# Patient Record
Sex: MALE | Race: WHITE | NOT HISPANIC OR LATINO
[De-identification: names, ages, dates, MRNs, and addresses within clinical notes are randomized per-mention and may not be internally consistent; named-entity substitution may affect disease eponyms.]

---

## 2017-05-18 ENCOUNTER — OTHER (OUTPATIENT)
Age: 77
End: 2017-05-18

## 2018-08-20 ENCOUNTER — APPOINTMENT (OUTPATIENT)
Dept: ENDOCRINOLOGY | Facility: CLINIC | Age: 78
End: 2018-08-20

## 2018-08-24 ENCOUNTER — APPOINTMENT (OUTPATIENT)
Dept: ENDOCRINOLOGY | Facility: CLINIC | Age: 78
End: 2018-08-24

## 2019-02-11 ENCOUNTER — APPOINTMENT (OUTPATIENT)
Dept: ENDOCRINOLOGY | Facility: CLINIC | Age: 79
End: 2019-02-11

## 2020-03-17 ENCOUNTER — APPOINTMENT (OUTPATIENT)
Dept: OPHTHALMOLOGY | Facility: CLINIC | Age: 80
End: 2020-03-17

## 2020-06-02 ENCOUNTER — APPOINTMENT (OUTPATIENT)
Dept: POPULATION HEALTH | Facility: CLINIC | Age: 80
End: 2020-06-02

## 2020-09-03 ENCOUNTER — APPOINTMENT (OUTPATIENT)
Dept: POPULATION HEALTH | Facility: CLINIC | Age: 80
End: 2020-09-03
Payer: MEDICARE

## 2020-09-03 PROCEDURE — 99205 OFFICE O/P NEW HI 60 MIN: CPT | Mod: 95

## 2020-09-03 NOTE — HEALTH RISK ASSESSMENT
[Patient reported colonoscopy was normal] : Patient reported colonoscopy was normal [ColonoscopyDate] : 02/17

## 2020-09-03 NOTE — HISTORY OF PRESENT ILLNESS
[FreeTextEntry1] : 79 yo m pmh R kidney cancer, DM2, HTN, HCL for telehealth visit regarding longstanding exposure to the [place] plume and is concerned about its impact on overall health.\par \par Kidney R ca in 2008, surgery only. Recurrence in 2011, treated with partial nephrectomy.\par \par Moved to [place] 3/1986 and still in same house.\par Grew vegetables for many years.\par \par In the house\par Son was in house for 12 years.\par \par 2018 soil was tested. \par Drink from bottled water x 5 y\par \par \par Never smoker.\par \par [place] [employer] [place] when son was a child.\par \par  > 30 years.\par Never smoker\par \par No family hx of kidney cancer. No hx of kidney disease\par

## 2020-09-03 NOTE — ASSESSMENT
[FreeTextEntry1] : 79 yo m pmh R kidney cancer, DM2, HTN, HCL for telehealth visit regarding longstanding exposure to the [place] plume which likely contributed to his kidney cancer. He has been exposed for ~35 years and 23 years at the time of his diagnosis. He moved there at time when the drinking water was likely contaminated and he was further exposed via contaminated soil, consumption of contaminated vegetables, and likely via inhalation following vapor intrusion. Of the contaminants in the plume, the ones most strongly linked to kidney cancer are TCE and cadmium. \par \par Moving forward there are additional cancers for which there is increased risk at risk. Among the cancers known to be associated with contaminants found in the soil at high levels are: Brain, kidney, lung, hematopoietic, melanoma, nasal, sinus, prostate.\par Among the non-cancer health outcomes include: Hepatocellular injury, kidney injury, neurological injury, autoimmune disease, particularly scleroderma, thyroid, reproductive (fetal cardiac defects, sperm impairment). However, during this visit there was no history given suggestive of these. \par \par While largely sx free, it is valuable and appropriate to evaluate for selected contaminant health effects including hematopoietic, hepatic, kidney and thyroid effects for the purposes of establishing a base line. Subsequently, such issues can be evaluated as guided by signs or symptoms. Given the pandemic, I'm concerned about sending into health facilities at this time. Will send me copies of recent blood work that may include some of the testing I would like done. \par Extensive discussion about these health effects, the importance of vigilance for s/sxs of these, good f/u care with  PMD and other doctors, and following through on routine cancer screenings. Despite the risks to health that exposure to these pollutants pose, I advised that vigilance was more appropriate than panic.\par I further advised that yearly assessment is warranted. If/when further exposure information becomes available, the plan/guidance will be adjusted accordingly.\par I counseled on LSM and preventive medicine strategies. \par All questions answered. Pt understands and agrees with plan. \par RTC 1 year\par

## 2025-04-21 ENCOUNTER — NON-APPOINTMENT (OUTPATIENT)
Age: 85
End: 2025-04-21

## 2025-04-21 ENCOUNTER — APPOINTMENT (OUTPATIENT)
Dept: OPHTHALMOLOGY | Facility: CLINIC | Age: 85
End: 2025-04-21
Payer: MEDICARE

## 2025-04-21 PROCEDURE — 92004 COMPRE OPH EXAM NEW PT 1/>: CPT

## 2025-05-28 ENCOUNTER — NON-APPOINTMENT (OUTPATIENT)
Age: 85
End: 2025-05-28

## 2025-05-28 ENCOUNTER — APPOINTMENT (OUTPATIENT)
Dept: OPHTHALMOLOGY | Facility: CLINIC | Age: 85
End: 2025-05-28
Payer: MEDICARE

## 2025-05-28 PROCEDURE — 92012 INTRM OPH EXAM EST PATIENT: CPT

## 2025-05-28 PROCEDURE — 92083 EXTENDED VISUAL FIELD XM: CPT

## 2025-05-28 PROCEDURE — 92133 CPTRZD OPH DX IMG PST SGM ON: CPT

## 2025-05-28 PROCEDURE — 76514 ECHO EXAM OF EYE THICKNESS: CPT

## 2025-05-28 PROCEDURE — 92020 GONIOSCOPY: CPT
